# Patient Record
Sex: FEMALE | Race: WHITE | Employment: FULL TIME | URBAN - METROPOLITAN AREA
[De-identification: names, ages, dates, MRNs, and addresses within clinical notes are randomized per-mention and may not be internally consistent; named-entity substitution may affect disease eponyms.]

---

## 2021-02-28 ENCOUNTER — HOSPITAL ENCOUNTER (EMERGENCY)
Age: 56
Discharge: HOME | End: 2021-02-28
Attending: EMERGENCY MEDICINE
Payer: COMMERCIAL

## 2021-02-28 VITALS
SYSTOLIC BLOOD PRESSURE: 151 MMHG | RESPIRATION RATE: 17 BRPM | TEMPERATURE: 97.3 F | DIASTOLIC BLOOD PRESSURE: 93 MMHG | OXYGEN SATURATION: 98 % | HEART RATE: 89 BPM

## 2021-02-28 DIAGNOSIS — G43.009 MIGRAINE WITHOUT AURA AND WITHOUT STATUS MIGRAINOSUS, NOT INTRACTABLE: Primary | ICD-10-CM

## 2021-02-28 PROCEDURE — 96374 THER/PROPH/DIAG INJ IV PUSH: CPT

## 2021-02-28 PROCEDURE — 99283 EMERGENCY DEPT VISIT LOW MDM: CPT

## 2021-02-28 PROCEDURE — 6360000002 HC RX W HCPCS: Performed by: EMERGENCY MEDICINE

## 2021-02-28 RX ORDER — PROPRANOLOL HYDROCHLORIDE 10 MG/1
10 TABLET ORAL 2 TIMES DAILY
COMMUNITY

## 2021-02-28 RX ORDER — DROPERIDOL 2.5 MG/ML
1.25 INJECTION, SOLUTION INTRAMUSCULAR; INTRAVENOUS ONCE
Status: COMPLETED | OUTPATIENT
Start: 2021-02-28 | End: 2021-02-28

## 2021-02-28 RX ORDER — BUPROPION HYDROCHLORIDE 100 MG/1
300 TABLET ORAL DAILY
COMMUNITY

## 2021-02-28 RX ADMIN — DROPERIDOL 1.25 MG: 2.5 INJECTION, SOLUTION INTRAMUSCULAR; INTRAVENOUS at 02:53

## 2021-02-28 ASSESSMENT — ENCOUNTER SYMPTOMS
GASTROINTESTINAL NEGATIVE: 1
EYES NEGATIVE: 1
RESPIRATORY NEGATIVE: 1

## 2021-02-28 NOTE — ED PROVIDER NOTES
[facility]        Pt Name: Mari Vargas  MRN: [id number]  [place] 1965  Date of evaluation: 2/28/2021  Provider: Vince Zaragoza MD  PCP: No primary care provider on file. CHIEF COMPLAINT       Chief Complaint   Patient presents with    Headache     headache and has been taking tylenol and ibuprofen with no releif. denies any s/s.  states hx of this type of headache. HISTORY OF PRESENT ILLNESS   (Location/Symptom, Timing/Onset, Context/Setting, Quality, Duration, Modifying Factors, Severity)  Note limiting factors. Mari Vargas is a 50 y.o. female with headache. She states that this headache is been going on over the last few days. She rates it as 9 out of 10 in global.  She has history of chronic recurrent headaches and this feels similar. She is been afebrile, denies any trauma, and she is not anticoagulated. She been afebrile as well. Nothing makes any better and light makes it worse. It was not abrupt in its onset. She has never had any history of cancer and there is no drug use. No other associated signs or symptoms    Nursing Notes were all reviewed and agreed with or any disagreements were addressed  in the HPI. REVIEW OF SYSTEMS    (2-9 systems for level 4, 10 or more for level 5)     Review of Systems   Constitutional: Negative. HENT: Negative. Eyes: Negative. Respiratory: Negative. Cardiovascular: Negative. Gastrointestinal: Negative. Endocrine: Negative. Genitourinary: Negative. Skin: Negative. Neurological: Positive for headaches. Hematological: Negative. Psychiatric/Behavioral: Negative. PAST MEDICAL HISTORY     Past Medical History:   Diagnosis Date    Diabetes mellitus ([address]     Headache        SURGICAL HISTORY   History reviewed. No pertinent surgical history.     CURRENTMEDICATIONS       Previous Medications    BUPROPION (WELLBUTRIN) 100 MG TABLET    Take 300 mg by mouth daily    METFORMIN (GLUCOPHAGE) 1000 MG TABLET    Take 1,000 mg by mouth 2 times daily (with meals)    PROPRANOLOL (INDERAL) 10 MG TABLET    Take 10 mg by mouth 2 times daily       ALLERGIES     Shellfish-derived products    FAMILYHISTORY     History reviewed. No pertinent family history. SOCIAL HISTORY       Social History     Socioeconomic History    Marital status:      Spouse name: None    Number of children: None    Years of education: None    Highest education level: None   Occupational History    None   Social Needs    Financial resource strain: None    Food insecurity     Worry: None     Inability: None    Transportation needs     Medical: None     Non-medical: None   Tobacco Use    Smoking status: Never Smoker   Substance and Sexual Activity    Alcohol use: Not Currently    Drug use: None    Sexual activity: None   Lifestyle    Physical activity     Days per week: None     Minutes per session: None    Stress: None   Relationships    Social connections     Talks on phone: None     Gets together: None     Attends Congregation service: None     Active member of club or organization: None     Attends meetings of clubs or organizations: None     Relationship status: None    Intimate partner violence     Fear of current or ex partner: None     Emotionally abused: None     Physically abused: None     Forced sexual activity: None   Other Topics Concern    None   Social History Narrative    None       SCREENINGS             PHYSICAL EXAM    (up to 7 for level 4, 8 or more for level 5)     ED Triage Vitals [02/28/21 0123]   BP Temp Temp Source Pulse Resp SpO2 Height Weight   (!) 178/103 97.3 °F (36.3 °C) Infrared 95 18 98 % -- --       Physical Exam  Vitals signs and nursing note reviewed. Constitutional:       General: She is in acute distress. HENT:      Head: Normocephalic and atraumatic.       Right Ear: External ear normal.      Left Ear: External ear normal.      Nose: Nose normal.      Mouth/Throat:      Mouth: Mucous membranes are moist.      Pharynx: Oropharynx is clear. Eyes:      Extraocular Movements: Extraocular movements intact. Conjunctiva/sclera: Conjunctivae normal.   Neck:      Musculoskeletal: Normal range of motion and neck supple. Cardiovascular:      Rate and Rhythm: Normal rate and regular rhythm. Pulses: Normal pulses. Heart sounds: Normal heart sounds. Pulmonary:      Effort: Pulmonary effort is normal.      Breath sounds: Normal breath sounds. Abdominal:      General: Abdomen is flat. Palpations: Abdomen is soft. Musculoskeletal: Normal range of motion. Skin:     General: Skin is warm and dry. Capillary Refill: Capillary refill takes less than 2 seconds. Neurological:      General: No focal deficit present. Mental Status: She is alert and oriented to person, place, and time. Cranial Nerves: Cranial nerve deficit present. Sensory: No sensory deficit. Motor: No weakness. Coordination: Coordination normal.      Deep Tendon Reflexes: Reflexes normal.   Psychiatric:         Mood and Affect: Mood normal.         Behavior: Behavior normal.         DIAGNOSTIC RESULTS   LABS:    Labs Reviewed - No data to display    All other labs were within normal range or not returned as of this dictation. EKG:       RADIOLOGY:        Interpretation per the Radiologist below, if available at the time of this note:    No orders to display     No results found.       PROCEDURES   Unless otherwise noted below, none     Procedures    CRITICAL CARE TIME   N/A    CONSULTS:  None      EMERGENCY DEPARTMENT COURSE and DIFFERENTIAL DIAGNOSIS/MDM:   Vitals:    Vitals:    02/28/21 0123 02/28/21 0317   BP: (!) 178/103 (!) 151/93   Pulse: 95 89   Resp: 18 17   Temp: 97.3 °F (36.3 °C)    TempSrc: Infrared    SpO2: 98% 98%       Patient was given thefollowing medications:  Medications   droperidol (INAPSINE) injection 1.25 mg (1.25 mg Intravenous Given 2/28/21 0253)           Patient does not have any risk factors for serious cause of headache as mentioned in my history of present illness. I do not suspect subarachnoid hemorrhage, intracranial mass, or infectious process. She has a history of chronic recurrent headaches that are very similar to this. Have treated her symptomatically. She has no focal neurologic deficit on physical examination. She is better after treatment. She will be discharged home in good condition to follow-up with primary care. Return for increased headache, persistent vomiting or any worsening symptoms. FINAL IMPRESSION      1. Migraine without aura and without status migrainosus, not intractable          DISPOSITION/PLAN   DISPOSITION Decision To Discharge 02/28/2021 03:17:54 AM      PATIENT REFERREDTO:  No follow-up provider specified. DISCHARGE MEDICATIONS:  New Prescriptions    No medications on file       DISCONTINUED MEDICATIONS:  Discontinued Medications    EPINEPHRINE (EPIPEN 2-PRABHJOT) 0.3 MG/0.3ML SOAJ INJECTION    Inject 0.3 mLs into the muscle once as needed (severe allergic reaction) Inject into lateral thigh muscle.               (Please note that portions ofthis note were completed with a voice recognition program.  Efforts were made to edit the dictations but occasionally words are mis-transcribed.)    Mike Ortiz MD (electronically signed)              Mike Ortiz MD  02/28/21 7821

## 2023-05-10 ENCOUNTER — OFFICE VISIT (OUTPATIENT)
Dept: ENT CLINIC | Age: 58
End: 2023-05-10
Payer: COMMERCIAL

## 2023-05-10 VITALS
WEIGHT: 150 LBS | BODY MASS INDEX: 27.6 KG/M2 | HEIGHT: 62 IN | HEART RATE: 112 BPM | SYSTOLIC BLOOD PRESSURE: 134 MMHG | OXYGEN SATURATION: 97 % | TEMPERATURE: 97.5 F | DIASTOLIC BLOOD PRESSURE: 91 MMHG

## 2023-05-10 DIAGNOSIS — R42 VERTIGO: Primary | Chronic | ICD-10-CM

## 2023-05-10 DIAGNOSIS — J30.9 ALLERGIC RHINITIS, UNSPECIFIED SEASONALITY, UNSPECIFIED TRIGGER: Chronic | ICD-10-CM

## 2023-05-10 DIAGNOSIS — R26.89 BALANCE DISORDER: Chronic | ICD-10-CM

## 2023-05-10 PROCEDURE — 99203 OFFICE O/P NEW LOW 30 MIN: CPT | Performed by: OTOLARYNGOLOGY

## 2023-05-10 RX ORDER — GLIMEPIRIDE 1 MG/1
1 TABLET ORAL
COMMUNITY

## 2023-05-10 ASSESSMENT — ENCOUNTER SYMPTOMS: SORE THROAT: 0

## 2023-06-15 ENCOUNTER — OFFICE VISIT (OUTPATIENT)
Dept: ENT CLINIC | Age: 58
End: 2023-06-15
Payer: COMMERCIAL

## 2023-06-15 VITALS
HEART RATE: 109 BPM | HEIGHT: 62 IN | WEIGHT: 149 LBS | SYSTOLIC BLOOD PRESSURE: 153 MMHG | TEMPERATURE: 97.7 F | BODY MASS INDEX: 27.42 KG/M2 | DIASTOLIC BLOOD PRESSURE: 121 MMHG | OXYGEN SATURATION: 98 %

## 2023-06-15 DIAGNOSIS — R26.89 BALANCE DISORDER: ICD-10-CM

## 2023-06-15 DIAGNOSIS — R42 VERTIGO: Primary | ICD-10-CM

## 2023-06-15 DIAGNOSIS — J30.9 ALLERGIC RHINITIS, UNSPECIFIED SEASONALITY, UNSPECIFIED TRIGGER: Chronic | ICD-10-CM

## 2023-06-15 PROCEDURE — 99213 OFFICE O/P EST LOW 20 MIN: CPT | Performed by: OTOLARYNGOLOGY

## 2023-06-15 RX ORDER — HYDROXYZINE HYDROCHLORIDE 25 MG/1
25 TABLET, FILM COATED ORAL 3 TIMES DAILY PRN
COMMUNITY

## 2023-06-20 LAB
A ALTERNATA IGE QN: 0.63 KU/L (ref 0–0.34)
A FUMIGATUS IGE QN: 0.38 KU/L (ref 0–0.34)
AMER SYCAMORE IGE QN: 0.48 KU/L (ref 0–0.34)
BARLEY IGE QN: 0.26 KU/L (ref 0–0.34)
BEEF IGE QN: 0.36 KU/L (ref 0–0.34)
BELL PEPPER IGE QN: 0.3 KU/L (ref 0–0.34)
BERMUDA GRASS IGE QN: <0.1 KU/L (ref 0–0.34)
BOXELDER IGE QN: 0.31 KU/L (ref 0–0.34)
C SPHAEROSPERMUM IGE QN: 0.39 KUL/L (ref 0–0.34)
CABBAGE IGE QN: 0.93 KU/L (ref 0–0.34)
CALIF WALNUT IGE QN: 0.33 KU/L (ref 0–0.34)
CARROT IGE QN: 0.15 KU/L (ref 0–0.34)
CAT DANDER IGE QN: <0.1 KU/L (ref 0–0.34)
CHICKEN SERUM PROT IGE QN: 1.49 KU/L (ref 0–0.34)
CMN PIGWEED IGE QN: 0.15 KU/L (ref 0–0.34)
CODFISH IGE QN: 1.38 KU/L (ref 0–0.34)
COMMON RAGWEED IGE QN: 0.48 KU/L (ref 0–0.34)
CORN IGE QN: 0.22 KU/L (ref 0–0.34)
COTTONWOOD IGE QN: 0.4 KU/L (ref 0–0.34)
COW MILK IGE QN: <0.1 KU/L (ref 0–0.34)
CRAB IGE QN: 4.42 KU/L (ref 0–0.34)
D FARINAE IGE QN: 5.53 KU/L (ref 0–0.34)
D PTERONYSS IGE QN: 3.29 KU/L (ref 0–0.34)
DOG DANDER IGE QN: 0.15 KU/L (ref 0–0.34)
EGG WHITE IGE QN: 0.13 KU/L (ref 0–0.34)
GRAPE IGE QN: 0.31 KU/L (ref 0–0.34)
IGE SERPL-ACNC: 861 IU/ML
IGE SERPL-ACNC: 871 IU/ML
LETTUCE IGE QN: 0.56 KU/L (ref 0–0.34)
M RACEMOSUS IGE QN: 0.24 KU/L (ref 0–0.34)
MOUSE EPITH IGE QN: 0.85 KU/L (ref 0–0.34)
OAT IGE QN: 0.22 KU/L (ref 0–0.34)
ORANGE IGE QN: <0.1 KU/L (ref 0–0.34)
P NOTATUM IGE QN: <0.1 KU/L (ref 0–0.34)
PEANUT IGE QN: 2.14 KU/L (ref 0–0.34)
PECAN/HICK TREE IGE QN: 0.27 KU/L (ref 0–0.34)
PORK IGE QN: 0.18 KU/L (ref 0–0.34)
POTATO IGE QN: 0.29 KU/L (ref 0–0.34)
RED CEDAR IGE QN: 0.49 KU/L (ref 0–0.34)
RICE IGE QN: <0.1 KU/L (ref 0–0.34)
ROACH IGE QN: 2.37 KU/L (ref 0–0.34)
RYE IGE QN: 0.2 KU/L (ref 0–0.34)
SALTWORT IGE QN: 0.51 KU/L (ref 0–0.34)
SHEEP SORREL IGE QN: 0.13 KU/L (ref 0–0.34)
SHRIMP IGE QN: 5.44 KU/L (ref 0–0.34)
SILVER BIRCH IGE QN: <0.1 KU/L (ref 0–0.34)
SOYBEAN IGE QN: 0.3 KU/L (ref 0–0.34)
TIMOTHY IGE QN: 0.25 KU/L (ref 0–0.34)
TOMATO IGE QN: 1.88 KU/L (ref 0–0.34)
TUNA IGE QN: 2.26 KU/L (ref 0–0.34)
WHEAT IGE QN: 0.26 KU/L (ref 0–0.34)
WHITE ASH IGE QN: 0.36 KU/L (ref 0–0.34)
WHITE BEAN IGE QN: 0.13 KU/L (ref 0–0.34)
WHITE ELM IGE QN: 0.24 KU/L (ref 0–0.34)
WHITE MULBERRY IGE QN: <0.1 KU/L (ref 0–0.34)
WHITE OAK IGE QN: 0.21 KU/L (ref 0–0.34)

## 2023-06-26 ENCOUNTER — TELEPHONE (OUTPATIENT)
Dept: ENT CLINIC | Age: 58
End: 2023-06-26

## 2023-08-21 ENCOUNTER — PROCEDURE VISIT (OUTPATIENT)
Dept: AUDIOLOGY | Age: 58
End: 2023-08-21
Payer: COMMERCIAL

## 2023-08-21 ENCOUNTER — OFFICE VISIT (OUTPATIENT)
Dept: ENT CLINIC | Age: 58
End: 2023-08-21
Payer: COMMERCIAL

## 2023-08-21 VITALS
HEIGHT: 62 IN | DIASTOLIC BLOOD PRESSURE: 82 MMHG | TEMPERATURE: 97.6 F | HEART RATE: 114 BPM | OXYGEN SATURATION: 98 % | BODY MASS INDEX: 27.25 KG/M2 | SYSTOLIC BLOOD PRESSURE: 154 MMHG

## 2023-08-21 DIAGNOSIS — H93.13 TINNITUS OF BOTH EARS: ICD-10-CM

## 2023-08-21 DIAGNOSIS — H93.13 TINNITUS OF BOTH EARS: Primary | ICD-10-CM

## 2023-08-21 DIAGNOSIS — R42 DIZZINESS: ICD-10-CM

## 2023-08-21 DIAGNOSIS — R42 VERTIGO: Primary | ICD-10-CM

## 2023-08-21 DIAGNOSIS — R42 DIZZINESS: Primary | ICD-10-CM

## 2023-08-21 PROCEDURE — 92567 TYMPANOMETRY: CPT

## 2023-08-21 PROCEDURE — 92540 BASIC VESTIBULAR EVALUATION: CPT

## 2023-08-21 PROCEDURE — 3017F COLORECTAL CA SCREEN DOC REV: CPT | Performed by: OTOLARYNGOLOGY

## 2023-08-21 PROCEDURE — G8419 CALC BMI OUT NRM PARAM NOF/U: HCPCS | Performed by: OTOLARYNGOLOGY

## 2023-08-21 PROCEDURE — G8427 DOCREV CUR MEDS BY ELIG CLIN: HCPCS | Performed by: OTOLARYNGOLOGY

## 2023-08-21 PROCEDURE — 99213 OFFICE O/P EST LOW 20 MIN: CPT | Performed by: OTOLARYNGOLOGY

## 2023-08-21 PROCEDURE — 92538 CALORIC VSTBLR TEST W/REC: CPT

## 2023-08-21 PROCEDURE — 92557 COMPREHENSIVE HEARING TEST: CPT

## 2023-08-21 PROCEDURE — 1036F TOBACCO NON-USER: CPT | Performed by: OTOLARYNGOLOGY

## 2023-08-21 NOTE — PATIENT INSTRUCTIONS
Noise-Induced Hearing Loss  What it is, and what you can do to prevent it      Exposure to loud sounds, in an occupational setting or recreational, can cause permanent hearing loss. Sound is measured in decibels (dB). Noise-induced hearing loss is the ONLY type of preventable hearing loss. Hearing loss related to noise exposure can occur at any age. There are small sensory cells, called inner and outer hair cells, within the inner ear (cochlea). These cells process the loudness (intensity) and pitch (frequency) of sound and send the signal to the brain via our auditory nerve (vestibulocochlear nerve, cranial nerve VIII). When these cells are damaged, they can result in permanent hearing loss and/or tinnitus. The hair cells responsible for high frequency sounds, like birds chirping, are most likely to be damaged due to loud sounds. The high frequency sounds are also very important for our clarity and understanding of speech. OCCUPATIONAL NOISE EXPOSURE RECREATIONAL NOISE EXPOSURE   Some jobs may have exposure to loud sounds in the workplace. These jobs may include but are not limited to:  [employer]  Construction  Welding  Landscaping  Hairdressing/hairstyling  [employer]   . .. And more! Many activities outside of work may cause permanent hearing loss. These activities may include but are not limited to:  Lawnmowers, leaf blowers  Farming equipment and animals (such as pigs squealing)  Chainsaws and other power tools  Playing musical instruments and/or singing  Listening to music too loudly - at concerts, through stereo, through ear buds or headphones  Attending sporting events  Attending fireworks shows or using fireworks at home  Use of firearms  . .. And more! REDUCE OR PROTECT YOUR EARS FROM NOISE EXPOSURE    To do your best to avoid noise-induced hearing loss, here are some tips:  Limit exposure to loud sounds.   85 dB (decibels) is safe for 8

## 2023-08-21 NOTE — PATIENT INSTRUCTIONS
Avoid working or being at heights, on ladders or scaffolding or near the edges of platforms or using heavy or complicated machinery or operating a motorized vehicle if you are experiencing dizziness and until having been dizzy free for 72 hours. Even then, take appropriate care and precautions as dizziness could occur at any time. Avoid any motions or activity that results in the off balance sensation. Stand up or arise slowly and in stages, as we discussed. You should return if your ears plug up with ear wax causing difficulty hearing or pressure. You should avoid exposure to excessively high levels of noise/sound and use hearing protection measures we discussed, as needed, if such exposure is unavoidable. NO Q-TIPS IN THE EARS  You should never clean your ears with a Q-tip, cotton tipped applicator, Rosanna pin, paper clip, pen cap, nail file, or any other instrument. I recommend only use of one the several ear wax removal kits available \"over the counter\" if you feel a need to try to remove ear wax. No other methods should be self used for this purpose as there is danger of injury to the ear and risk of irreparable and irreversible permanent hearing loss. Avoid the allergenic foods positive on your testing. Claribel Márquez

## 2023-08-21 NOTE — PROGRESS NOTES
[address]         Chief Complaint   Patient presents with    Dizziness    Allergic Rhinitis        HISTORY OF PRESENT ILLNESS       Brenda Jha is a 66 y.o. female here for follow up of vertigo and balance disorder and allergic rhinitis. AUDIOGRAM                VESTIBULAR EVALUATION - VIDEONYSTAGMOGRAPHY (VNG)     Brenda Jha was seen today, 8/21/2023, for videonystagmography (VNG) evaluation. The VNG is an examination of the central vestibulo-ocular pathway that is measured via eye movements. IMPRESSIONS:       Normal VNG. Caloric irrigations and all sub test results within normal limits. LABORATORY:  In-vitro allergy testing:  multiple respiratory/inhalant allergens positive (see report), and several food allergens positive. COUNSELING    The audiogram results were reviewed and discussed with the patient, whom I advised of the normal hearing demonstrated. The VNG results were reviewed and discussed with the patient, whom I advised of the normal vestibular function. I discussed possible central disorder or neurologic disorder as etiology of balance disorder. I advised that I cannot rule out intermittent vestibular dysfunction. I discussed activity and safety precautions, fall prevention and avoidance, and need for continued follow-up. Dixie Barbour / Radha Scruggs / Oliverio Woodward / Justin De León was seen today for dizziness and allergic rhinitis . Diagnoses and all orders for this visit:    Becka Crawford MD, Neurology, [facility]       RECOMMENDATIONS / PLAN   Meclizine 25 mg po TID prn dizziness. OTC ear wax softening drops and/or removal kit prn. Return if symptoms worsen or, for any other ENT or sinus problems or symptoms.          Patient Instructions     Avoid working or being at heights, on ladders or scaffolding or near the edges of platforms or

## 2023-08-21 NOTE — PROGRESS NOTES
Oz Nagel   1965, 66 y.o. female   [id number]       Referring Provider: Sabine Enamorado MD  Referral Type: In an order in [address] [place] [address]    Reason for Visit: Evaluation of suspected change in hearing, tinnitus, or balance. ADULT AUDIOLOGIC EVALUATION      Oz Nagel is a 66 y.o. female seen today, 8/21/2023 , for an initial audiologic evaluation. Patient was seen by Sabine Enamorado MD following today's evaluation. AUDIOLOGIC AND OTHER PERTINENT MEDICAL HISTORY:      Oz Nagel reports last hearing test was last year. She has some concerns with ear itching the left ear and has noted some issues with her blood sugar. She notes intermittent tinnitus bilaterally that is not bothersome. Family history of hearing loss in uncle on father's side, born deaf. Denies all other otologic symptoms     She denied otalgia, aural fullness, otorrhea, history of falls, history of noise exposure, history of head trauma, and history of ear surgery    Date: 8/21/2023     IMPRESSIONS:      Today's results revealed normal hearing bilaterally. Excellent speech understanding when in quiet. Tympanometry indicates normal middle ear function. Discussed test results and implications with patient. Discussed noise exposure and the importance of appropriate hearing protection when in hazardous noise environments. Follow medical recommendations of Sabine Enamorado MD.     ASSESSMENT AND FINDINGS:     Otoscopy unremarkable. RIGHT EAR:  Hearing Sensitivity: Normal hearing sensitivity 250-8000 Hz  Speech Recognition Threshold: 15 dB HL  Word Recognition: Excellent 100%, based on NU-6 25-word list at 55 dBHL using recorded speech stimuli. Tympanometry: Normal peak pressure and compliance, Type A tympanogram, consistent with normal middle ear function.        LEFT EAR:  Hearing Sensitivity: Normal hearing sensitivity 250-8000 Hz  Speech Recognition Threshold: 10 dB HL  Word Recognition: Excellent 100%, based on NU-6 25-word list at 50 dBHL using

## 2023-08-21 NOTE — PROGRESS NOTES
to pre-test instructions: Yes    Date: 8/21/2023     TESTS COMPLETED AND RESULTS:      GAZE:   - DOWN: With Fixation: Normal, Without Fixation: Normal   - UP: With Fixation: Normal, Without Fixation: Normal   - LEFT: With Fixation: Normal, Without Fixation: Normal   - RIGHT: With Fixation: Normal, Without Fixation: Normal    SACCADES: Normal     SMOOTH PURSUIT: Normal    OPTOKINETICS: Normal    HORIZONAL HEAD SHAKE TEST: Normal    LIZ-HALLPIKE:   - HEAD LEFT:  Normal   - HEAD RIGHT: Normal    HEAD POSITIONALS, SUPINE POSITION 30 DEGREES:   - HEAD RIGHT: Normal   - HEAD CENTER: Normal   - HEAD LEFT: Normal    BODY POSITIONALS:   - BODY RIGHT: Normal   - BODY LEFT: Normal    BITHERMAL CALORIC IRRIGATIONS: Bithermal caloric irrigations revealed symmetrical function of both balance systems. UW: LEFT 3%               DP: RIGHT 20%                Hypofunction: []YES [x]NO    STIMULATION Cool Right Cool Left Warm Right Warm Left   PEAK  7 10 11 7         PATIENT EDUCATION:      The following items were discussed with the patient:   Reviewed purpose of testing completed today, general vestibular system function, and results obtained today. RECOMMENDATIONS:       - Continue medical follow-up with Sabine Enamorado MD.   - Retest hearing as medically indicated and/or sooner if a change in hearing is noted.       Kuldip Magallanes  Audiologist      Chart CC'd to: Sabine Enamorado MD

## 2023-12-27 ENCOUNTER — APPOINTMENT (OUTPATIENT)
Dept: CT IMAGING | Age: 58
End: 2023-12-27
Payer: COMMERCIAL

## 2023-12-27 ENCOUNTER — HOSPITAL ENCOUNTER (EMERGENCY)
Age: 58
Discharge: HOME | End: 2023-12-27
Attending: EMERGENCY MEDICINE
Payer: COMMERCIAL

## 2023-12-27 VITALS
TEMPERATURE: 98.5 F | DIASTOLIC BLOOD PRESSURE: 92 MMHG | RESPIRATION RATE: 18 BRPM | OXYGEN SATURATION: 100 % | BODY MASS INDEX: 27.23 KG/M2 | SYSTOLIC BLOOD PRESSURE: 155 MMHG | HEIGHT: 62 IN | HEART RATE: 75 BPM | WEIGHT: 148 LBS

## 2023-12-27 DIAGNOSIS — R10.9 ACUTE RIGHT FLANK PAIN: Primary | ICD-10-CM

## 2023-12-27 LAB
ALBUMIN SERPL-MCNC: 4.4 G/DL (ref 3.4–5)
ALBUMIN/GLOB SERPL: 1.8 {RATIO} (ref 1.1–2.2)
ALP SERPL-CCNC: 57 U/L (ref 40–129)
ALT SERPL-CCNC: 9 U/L (ref 10–40)
ANION GAP SERPL CALCULATED.3IONS-SCNC: 10 MMOL/L (ref 3–16)
AST SERPL-CCNC: 12 U/L (ref 15–37)
BASOPHILS # BLD: 0.1 K/UL (ref 0–0.2)
BASOPHILS NFR BLD: 0.9 %
BILIRUB SERPL-MCNC: <0.2 MG/DL (ref 0–1)
BILIRUB UR QL STRIP.AUTO: NEGATIVE
BUN SERPL-MCNC: 26 MG/DL (ref 7–20)
CALCIUM SERPL-MCNC: 9.8 MG/DL (ref 8.3–10.6)
CHLORIDE SERPL-SCNC: 102 MMOL/L (ref 99–110)
CLARITY UR: CLEAR
CO2 SERPL-SCNC: 26 MMOL/L (ref 21–32)
COLOR UR: YELLOW
CREAT SERPL-MCNC: 0.6 MG/DL (ref 0.6–1.1)
DEPRECATED RDW RBC AUTO: 13 % (ref 12.4–15.4)
EOSINOPHIL # BLD: 0.2 K/UL (ref 0–0.6)
EOSINOPHIL NFR BLD: 2.8 %
GFR SERPLBLD CREATININE-BSD FMLA CKD-EPI: >60 ML/MIN/{1.73_M2}
GLUCOSE SERPL-MCNC: 164 MG/DL (ref 70–99)
GLUCOSE UR STRIP.AUTO-MCNC: NEGATIVE MG/DL
HCT VFR BLD AUTO: 39.9 % (ref 36–48)
HGB BLD-MCNC: 13.6 G/DL (ref 12–16)
HGB UR QL STRIP.AUTO: NEGATIVE
KETONES UR STRIP.AUTO-MCNC: NEGATIVE MG/DL
LEUKOCYTE ESTERASE UR QL STRIP.AUTO: NEGATIVE
LIPASE SERPL-CCNC: 41 U/L (ref 13–60)
LYMPHOCYTES # BLD: 1.7 K/UL (ref 1–5.1)
LYMPHOCYTES NFR BLD: 28.2 %
MCH RBC QN AUTO: 29.9 PG (ref 26–34)
MCHC RBC AUTO-ENTMCNC: 34.2 G/DL (ref 31–36)
MCV RBC AUTO: 87.4 FL (ref 80–100)
MONOCYTES # BLD: 0.4 K/UL (ref 0–1.3)
MONOCYTES NFR BLD: 7 %
NEUTROPHILS # BLD: 3.6 K/UL (ref 1.7–7.7)
NEUTROPHILS NFR BLD: 61.1 %
NITRITE UR QL STRIP.AUTO: NEGATIVE
PH UR STRIP.AUTO: 5.5 [PH] (ref 5–8)
PLATELET # BLD AUTO: 231 K/UL (ref 135–450)
PMV BLD AUTO: 9.4 FL (ref 5–10.5)
POTASSIUM SERPL-SCNC: 3.9 MMOL/L (ref 3.5–5.1)
PROT SERPL-MCNC: 6.9 G/DL (ref 6.4–8.2)
PROT UR STRIP.AUTO-MCNC: NEGATIVE MG/DL
RBC # BLD AUTO: 4.57 M/UL (ref 4–5.2)
SODIUM SERPL-SCNC: 138 MMOL/L (ref 136–145)
SP GR UR STRIP.AUTO: 1.02 (ref 1–1.03)
TROPONIN, HIGH SENSITIVITY: 7 NG/L (ref 0–14)
UA COMPLETE W REFLEX CULTURE PNL UR: NORMAL
UA DIPSTICK W REFLEX MICRO PNL UR: NORMAL
URN SPEC COLLECT METH UR: NORMAL
UROBILINOGEN UR STRIP-ACNC: 1 E.U./DL
WBC # BLD AUTO: 6 K/UL (ref 4–11)

## 2023-12-27 PROCEDURE — 80053 COMPREHEN METABOLIC PANEL: CPT

## 2023-12-27 PROCEDURE — 74177 CT ABD & PELVIS W/CONTRAST: CPT

## 2023-12-27 PROCEDURE — 99285 EMERGENCY DEPT VISIT HI MDM: CPT

## 2023-12-27 PROCEDURE — 85025 COMPLETE CBC W/AUTO DIFF WBC: CPT

## 2023-12-27 PROCEDURE — 81003 URINALYSIS AUTO W/O SCOPE: CPT

## 2023-12-27 PROCEDURE — 6360000004 HC RX CONTRAST MEDICATION: Performed by: EMERGENCY MEDICINE

## 2023-12-27 PROCEDURE — 84484 ASSAY OF TROPONIN QUANT: CPT

## 2023-12-27 PROCEDURE — 6360000002 HC RX W HCPCS: Performed by: EMERGENCY MEDICINE

## 2023-12-27 PROCEDURE — 83690 ASSAY OF LIPASE: CPT

## 2023-12-27 PROCEDURE — 96374 THER/PROPH/DIAG INJ IV PUSH: CPT

## 2023-12-27 RX ORDER — KETOROLAC TROMETHAMINE 15 MG/ML
15 INJECTION, SOLUTION INTRAMUSCULAR; INTRAVENOUS ONCE
Status: COMPLETED | OUTPATIENT
Start: 2023-12-27 | End: 2023-12-27

## 2023-12-27 RX ORDER — METHOCARBAMOL 750 MG/1
750 TABLET, FILM COATED ORAL 3 TIMES DAILY PRN
Qty: 15 TABLET | Refills: 0 | Status: SHIPPED | OUTPATIENT
Start: 2023-12-27

## 2023-12-27 RX ADMIN — IOPAMIDOL 75 ML: 755 INJECTION, SOLUTION INTRAVENOUS at 07:24

## 2023-12-27 RX ADMIN — KETOROLAC TROMETHAMINE 15 MG: 15 INJECTION, SOLUTION INTRAMUSCULAR; INTRAVENOUS at 06:30

## 2023-12-27 NOTE — ED PROVIDER NOTES
[facility]      Pt Name: Oz Nagel  MRN: [id number]  [address] [facility] 1965  Date of evaluation: 12/27/2023  Provider: Alexandra Pedersen MD  [address]  Chief Complaint   Patient presents with    Abdominal Pain     Pt arrives in the ED with c/o right sided upper abd pain that wraps around to her side. Pain rating 6/10 with sharp pains that shoot it up to a 10. HPI  Oz Nagel is a 66 y.o. female who presents because of abdominal pain. She has pain in the right upper quadrant of her abdomen for the past week and a half. She describes it as a pressure feeling and it shoots pains to her back intermittently. She thought it would go away but it persist.  It is worse with movement. It sometimes is worse with eating. She denies any nausea or vomiting. Denies fever. Denies any change in urination or bowel pattern. Several days ago, the discomfort did radiate up into her chest.  She denies any rash. She denies any history of abdominal surgeries. She is postmenopausal by about 10 years. She has a numb and tingling sensation to the right upper abdominal area where she feels like there is a fullness noted there, particularly when she stands up    REVIEW OF SYSTEMS:  No fever, occasional breathing difficulty, no dysuria  Pertinent positives and negatives as per the HPI. All other pertinent review of systems reviewed and negative. Nursing notes reviewed. PAST MEDICAL HISTORY:  Past Medical History:   Diagnosis Date    Diabetes mellitus ([address])     Headache      SURGICAL HISTORY  History reviewed. No pertinent surgical history. MEDICATIONS:  No current facility-administered medications on file prior to encounter.      Current Outpatient Medications on File Prior to Encounter   Medication Sig Dispense Refill    hydrOXYzine HCl (ATARAX) 25 MG tablet Take 1 tablet by mouth 3 times daily as needed for Anxiety Takes very rarely      glimepiride (AMARYL) 1 MG tablet Take 1 tablet by mouth